# Patient Record
Sex: MALE | Race: WHITE | URBAN - METROPOLITAN AREA
[De-identification: names, ages, dates, MRNs, and addresses within clinical notes are randomized per-mention and may not be internally consistent; named-entity substitution may affect disease eponyms.]

---

## 2017-05-09 ENCOUNTER — OFFICE VISIT (OUTPATIENT)
Dept: FAMILY MEDICINE | Facility: CLINIC | Age: 67
End: 2017-05-09
Payer: COMMERCIAL

## 2017-05-09 VITALS
DIASTOLIC BLOOD PRESSURE: 80 MMHG | OXYGEN SATURATION: 99 % | WEIGHT: 184.5 LBS | RESPIRATION RATE: 18 BRPM | HEIGHT: 74 IN | TEMPERATURE: 98.1 F | BODY MASS INDEX: 23.68 KG/M2 | HEART RATE: 90 BPM | SYSTOLIC BLOOD PRESSURE: 136 MMHG

## 2017-05-09 DIAGNOSIS — J20.9 ACUTE BRONCHITIS, UNSPECIFIED ORGANISM: Primary | ICD-10-CM

## 2017-05-09 PROCEDURE — 99214 OFFICE O/P EST MOD 30 MIN: CPT | Performed by: FAMILY MEDICINE

## 2017-05-09 RX ORDER — CEFDINIR 300 MG/1
300 CAPSULE ORAL 2 TIMES DAILY
Qty: 20 CAPSULE | Refills: 0 | Status: SHIPPED | OUTPATIENT
Start: 2017-05-09 | End: 2018-05-09

## 2017-05-09 RX ORDER — CODEINE PHOSPHATE AND GUAIFENESIN 10; 100 MG/5ML; MG/5ML
1 SOLUTION ORAL EVERY 4 HOURS PRN
Qty: 120 ML | Refills: 0 | Status: SHIPPED | OUTPATIENT
Start: 2017-05-09 | End: 2018-05-09

## 2017-05-09 NOTE — MR AVS SNAPSHOT
"              After Visit Summary   2017    Go Barros    MRN: [id number]           Patient Information     Date Of Birth          1950        Visit Information        Provider Department      2017 2:15 PM Ming Hampton MD [facility]        Today's Diagnoses     Acute bronchitis, unspecified organism    -  1       Follow-ups after your visit        Who to contact     If you have questions or need follow up information about today's clinic visit or your schedule please contact [facility] directly at [phone].  Normal or non-critical lab and imaging results will be communicated to you by [employer], letter or phone within 4 business days after the clinic has received the results. If you do not hear from us within 7 days, please contact the clinic through [employer] or phone. If you have a critical or abnormal lab result, we will notify you by phone as soon as possible.  Submit refill requests through [employer] or call your pharmacy and they will forward the refill request to us. Please allow 3 business days for your refill to be completed.          Additional Information About Your Visit        MyChart Information     [facility] [facility] lets you send messages to your doctor, view your test results, renew your prescriptions, schedule appointments and more. To sign up, go to [email or web address] . Click on \"Log in\" on the left side of the screen, which will take you to the Welcome page. Then click on \"Sign up Now\" on the right side of the page.     You will be asked to enter the access code listed below, as well as some personal information. Please follow the directions to create your username and password.     Your access code is: [email or web address]  Expires: 2017  4:29 PM     Your access code will  in 90 days. If you need help or a new code, please call your [facility] or [phone].        Care EveryWhere ID     This is your Care EveryWhere ID. This could be " "used by other organizations to access your [place] medical records  [id number]        Your Vitals Were     Pulse Temperature Respirations Height Pulse Oximetry BMI (Body Mass Index)    90 98.1  F (36.7  C) (Oral) 18 6' 2\" (1.88 m) 99% 23.69 kg/m2       Blood Pressure from Last 3 Encounters:   05/09/17 136/80    Weight from Last 3 Encounters:   05/09/17 184 lb 8 oz (83.7 kg)              Today, you had the following     No orders found for display         Today's Medication Changes          These changes are accurate as of: 5/9/17 11:59 PM.  If you have any questions, ask your nurse or doctor.               Start taking these medicines.        Dose/Directions    cefdinir 300 MG capsule   Commonly known as:  OMNICEF   Used for:  Acute bronchitis, unspecified organism   Started by:  Ming Hampton MD        Dose:  300 mg   Take 1 capsule (300 mg) by mouth 2 times daily   Quantity:  20 capsule   Refills:  0       guaiFENesin-codeine 100-10 MG/5ML Soln solution   Commonly known as:  ROBITUSSIN AC   Used for:  Acute bronchitis, unspecified organism   Started by:  Ming Hampton MD        Dose:  1 tsp.   Take 5 mLs by mouth every 4 hours as needed for cough   Quantity:  120 mL   Refills:  0            Where to get your medicines      These medications were sent to [place] [address] [place] [address] [address]    Hours:  Tech issues with their phone system Phone:  [phone]     cefdinir 300 MG capsule         Some of these will need a paper prescription and others can be bought over the counter.  Ask your nurse if you have questions.     Bring a paper prescription for each of these medications     guaiFENesin-codeine 100-10 MG/5ML Soln solution                Primary Care Provider    None Specified       No primary provider on file.        Thank you!     Thank you for choosing [facility]  for your care. Our goal is always to provide you with " excellent care. Hearing back from our patients is one way we can continue to improve our services. Please take a few minutes to complete the written survey that you may receive in the mail after your visit with us. Thank you!             Your Updated Medication List - Protect others around you: Learn how to safely use, store and throw away your medicines at www.disposemymeds.org.          This list is accurate as of: 5/9/17 11:59 PM.  Always use your most recent med list.                   Brand Name Dispense Instructions for use    cefdinir 300 MG capsule    OMNICEF    20 capsule    Take 1 capsule (300 mg) by mouth 2 times daily       guaiFENesin-codeine 100-10 MG/5ML Soln solution    ROBITUSSIN AC    120 mL    Take 5 mLs by mouth every 4 hours as needed for cough

## 2017-05-09 NOTE — PROGRESS NOTES
"  SUBJECTIVE:                                                    Go Barros is a 67 year old male who presents to clinic today for the following health issues:    Cough x 10 days. He states he has a chronic cough which typically worsens twice a year. He has never been diagnosed with what is causing it. He saw his PCP in [place] on 5/1 and was given prescriptions for benzonatate and hydrocodone cough syrup. He states these usually work, but have not helped his cough this time. He feels his cough is worsening and he also has reported rhinorrhea and subjective chills. The cough is worse at night. It is mostly non-productive, but in the last 2 days he has been coughing up yellow sputum. No history of asthma or pneumonia. He had a chest x-ray done on 5/1 which was normal.     ROS:  Constitutional, HEENT, cardiovascular, pulmonary, gi and gu systems are negative, except as otherwise noted.    OBJECTIVE:                                                    /80  Pulse 90  Temp 98.1  F (36.7  C) (Oral)  Resp 18  Ht 6' 2\" (1.88 m)  Wt 184 lb 8 oz (83.7 kg)  SpO2 99%  BMI 23.69 kg/m2  Body mass index is 23.69 kg/(m^2).  GENERAL: healthy, alert and no distress  HENT: ear canals and TM's normal, nose and mouth without ulcers or lesions  NECK: no adenopathy, no asymmetry, masses, or scars and thyroid normal to palpation  RESP:  Lung sounds abnormal bilaterally, heard squeaking sound at bases, sound may be consistent with air traveling through fluid   CV: regular rate and rhythm, normal S1 S2, no S3 or S4, no murmur, click or rub    Diagnostic Test Results:  none      ASSESSMENT/PLAN:                                                        ICD-10-CM    1. Acute bronchitis, unspecified organism J20.9 cefdinir (OMNICEF) 300 MG capsule     guaiFENesin-codeine (ROBITUSSIN AC) 100-10 MG/5ML SOLN solution       Plan:  1) given antibiotic for presumed bronchial infection   2) can try alternative cough syrup   3) " x-ray was not obtained as he had one on 5/1 and it likely would not have changed treatment plan  4) recommend follow up with PCP once patient is home next week, can follow up here sooner if needed     25 minutes in exam and counseling. 50% of this time in counseling regards to ongoing cough and bronchial infection.    Discussed. Potential etiologies treatment and follow-up    Ming Hampton MD  [facility]

## 2017-05-09 NOTE — NURSING NOTE
"Chief Complaint   Patient presents with     Cough       Initial /80  Pulse 90  Temp 98.1  F (36.7  C) (Oral)  Resp 18  Ht 6' 2\" (1.88 m)  Wt 184 lb 8 oz (83.7 kg)  SpO2 99%  BMI 23.69 kg/m2 Estimated body mass index is 23.69 kg/(m^2) as calculated from the following:    Height as of this encounter: 6' 2\" (1.88 m).    Weight as of this encounter: 184 lb 8 oz (83.7 kg).  Medication Reconciliation: complete       Makayla Campa CMA      "

## 2017-12-28 ENCOUNTER — OFFICE VISIT (OUTPATIENT)
Dept: FAMILY MEDICINE | Facility: CLINIC | Age: 67
End: 2017-12-28
Payer: COMMERCIAL

## 2017-12-28 VITALS
WEIGHT: 188.3 LBS | HEIGHT: 74 IN | DIASTOLIC BLOOD PRESSURE: 68 MMHG | OXYGEN SATURATION: 100 % | BODY MASS INDEX: 24.17 KG/M2 | TEMPERATURE: 97.8 F | SYSTOLIC BLOOD PRESSURE: 124 MMHG | RESPIRATION RATE: 14 BRPM | HEART RATE: 101 BPM

## 2017-12-28 DIAGNOSIS — R05.9 COUGH: Primary | ICD-10-CM

## 2017-12-28 PROCEDURE — 99213 OFFICE O/P EST LOW 20 MIN: CPT | Performed by: FAMILY MEDICINE

## 2017-12-28 RX ORDER — ATORVASTATIN CALCIUM 40 MG/1
40 TABLET, FILM COATED ORAL
COMMUNITY

## 2017-12-28 RX ORDER — CODEINE PHOSPHATE AND GUAIFENESIN 10; 100 MG/5ML; MG/5ML
1 SOLUTION ORAL EVERY 4 HOURS PRN
Qty: 120 ML | Refills: 1 | Status: SHIPPED | OUTPATIENT
Start: 2017-12-28

## 2017-12-28 RX ORDER — DILTIAZEM HYDROCHLORIDE 240 MG/1
240 CAPSULE, EXTENDED RELEASE ORAL
COMMUNITY

## 2017-12-28 RX ORDER — RIVAROXABAN 20 MG/1
TABLET, FILM COATED ORAL
Refills: 3 | COMMUNITY
Start: 2017-04-19

## 2017-12-28 NOTE — MR AVS SNAPSHOT
"              After Visit Summary   2017    Go Barros    MRN: [id number]           Patient Information     Date Of Birth          1950        Visit Information        Provider Department      2017 3:30 PM Ming Hampton MD [facility]        Today's Diagnoses     Cough    -  1       Follow-ups after your visit        Who to contact     If you have questions or need follow up information about today's clinic visit or your schedule please contact [facility] directly at [phone].  Normal or non-critical lab and imaging results will be communicated to you by MyChart, letter or phone within 4 business days after the clinic has received the results. If you do not hear from us within 7 days, please contact the clinic through [employer] or phone. If you have a critical or abnormal lab result, we will notify you by phone as soon as possible.  Submit refill requests through [employer] or call your pharmacy and they will forward the refill request to us. Please allow 3 business days for your refill to be completed.          Additional Information About Your Visit        MyChart Information     [employer] lets you send messages to your doctor, view your test results, renew your prescriptions, schedule appointments and more. To sign up, go to [email or web address] . Click on \"Log in\" on the left side of the screen, which will take you to the Welcome page. Then click on \"Sign up Now\" on the right side of the page.     You will be asked to enter the access code listed below, as well as some personal information. Please follow the directions to create your username and password.     Your access code is: [email or web address]  Expires: 3/28/2018  3:41 PM     Your access code will  in 90 days. If you need help or a new code, please call your [facility] or [phone].        Care EveryWhere ID     This is your Care EveryWhere ID. This could be used by other organizations to " "access your [place] medical records  [id number]        Your Vitals Were     Pulse Temperature Respirations Height Pulse Oximetry BMI (Body Mass Index)    101 97.8  F (36.6  C) (Oral) 14 6' 2\" (1.88 m) 100% 24.18 kg/m2       Blood Pressure from Last 3 Encounters:   12/28/17 124/68   05/09/17 136/80    Weight from Last 3 Encounters:   12/28/17 188 lb 4.8 oz (85.4 kg)   05/09/17 184 lb 8 oz (83.7 kg)              Today, you had the following     No orders found for display         Today's Medication Changes          These changes are accurate as of: 12/28/17  3:41 PM.  If you have any questions, ask your nurse or doctor.               These medicines have changed or have updated prescriptions.        Dose/Directions    * guaiFENesin-codeine 100-10 MG/5ML Soln solution   Commonly known as:  ROBITUSSIN AC   This may have changed:  Another medication with the same name was added. Make sure you understand how and when to take each.   Used for:  Acute bronchitis, unspecified organism   Changed by:  Ming Hampton MD        Dose:  1 tsp.   Take 5 mLs by mouth every 4 hours as needed for cough   Quantity:  120 mL   Refills:  0       * guaiFENesin-codeine 100-10 MG/5ML Soln solution   Commonly known as:  ROBITUSSIN AC   This may have changed:  You were already taking a medication with the same name, and this prescription was added. Make sure you understand how and when to take each.   Used for:  Cough   Changed by:  Ming Hampton MD        Dose:  1 tsp.   Take 5 mLs by mouth every 4 hours as needed for cough   Quantity:  120 mL   Refills:  1       * Notice:  This list has 2 medication(s) that are the same as other medications prescribed for you. Read the directions carefully, and ask your doctor or other care provider to review them with you.         Where to get your medicines      Some of these will need a paper prescription and others can be bought over the counter.  Ask your nurse if you have questions.     Bring a " paper prescription for each of these medications     guaiFENesin-codeine 100-10 MG/5ML Soln solution                Primary Care Provider Fax #    Physician No Ref-Primary [phone]       No address on file        Equal Access to Services     GEORGE VANN : Yao alma rosa ku marco Soalexisali, waaxda luqadaha, qaybta kaalmada ademajor, margoth davilamaksim martinez. So [facility] [phone].    ATENCIÓN: Si habla español, tiene a gar disposición servicios gratuitos de asistencia lingüística. Llame al [phone].    We comply with applicable federal civil rights laws and [place] laws. We do not discriminate on the basis of race, color, national origin, age, disability, sex, sexual orientation, or gender identity.            Thank you!     Thank you for choosing [facility]  for your care. Our goal is always to provide you with excellent care. Hearing back from our patients is one way we can continue to improve our services. Please take a few minutes to complete the written survey that you may receive in the mail after your visit with us. Thank you!             Your Updated Medication List - Protect others around you: Learn how to safely use, store and throw away your medicines at www.disposemymeds.org.          This list is accurate as of: 12/28/17  3:41 PM.  Always use your most recent med list.                   Brand Name Dispense Instructions for use Diagnosis    atorvastatin 40 MG tablet    LIPITOR     Take 40 mg by mouth        cefdinir 300 MG capsule    OMNICEF    20 capsule    Take 1 capsule (300 mg) by mouth 2 times daily    Acute bronchitis, unspecified organism       diltiazem 240 MG 24 hr ER beaded capsule    TIAZAC     Take 240 mg by mouth        * guaiFENesin-codeine 100-10 MG/5ML Soln solution    ROBITUSSIN AC    120 mL    Take 5 mLs by mouth every 4 hours as needed for cough    Acute bronchitis, unspecified organism       * guaiFENesin-codeine 100-10 MG/5ML Soln solution     ROBITUSSIN AC    120 mL    Take 5 mLs by mouth every 4 hours as needed for cough    Cough       * metFORMIN 500 MG tablet    GLUCOPHAGE     Take 500 mg by mouth        * metFORMIN 1000 MG tablet    GLUCOPHAGE     TAKE 1 TABLET BY MOUTH TWICE A DAY        XARELTO 20 MG Tabs tablet   Generic drug:  rivaroxaban ANTICOAGULANT      TAKE 1 TABLET ORALLY DAILY        * Notice:  This list has 4 medication(s) that are the same as other medications prescribed for you. Read the directions carefully, and ask your doctor or other care provider to review them with you.

## 2017-12-28 NOTE — PROGRESS NOTES
SUBJECTIVE:   Go Barros is a 67 year old male who presents to clinic today for the following health issues:      Chronic cough for years, getting worse over the last 3 months, cough has become more consistent. Sees pulmonary and has CT of lungs schedules.  Has been using inhaler spray for 5 weeks. Cant sleep at night. States the only think that work is hydrocodone.      OBJECTIVE:  Vitals as noted by Nurse/MA above.  Appearance: in no apparent distress.   ENT- nasal mucosa pale and congested.   Chest - chest clear to IPPA, S1, S2 normal, no murmur, no gallop, rate regular .    ASSESSMENT:   Bronchitis; this is chronic and he uses appears chronically sara ac. Down to 1 tsp per night. Workup with pulmonology.     PLAN:  Symptomatic therapy suggested: push fluids.     He knows that this is not treatment. Needs his PCP to quide him as well as pulmonology. If no treatable findings from pulmonology then needs plan long term how to treat his symptom.

## 2017-12-28 NOTE — NURSING NOTE
"Chief Complaint   Patient presents with     Cough       Initial /68  Pulse 101  Temp 97.8  F (36.6  C) (Oral)  Resp 14  Ht 6' 2\" (1.88 m)  Wt 188 lb 4.8 oz (85.4 kg)  SpO2 100%  BMI 24.18 kg/m2 Estimated body mass index is 24.18 kg/(m^2) as calculated from the following:    Height as of this encounter: 6' 2\" (1.88 m).    Weight as of this encounter: 188 lb 4.8 oz (85.4 kg).  Medication Reconciliation: complete       Makayla Campa CMA      "

## 2018-05-09 ENCOUNTER — OFFICE VISIT (OUTPATIENT)
Dept: FAMILY MEDICINE | Facility: CLINIC | Age: 68
End: 2018-05-09
Payer: COMMERCIAL

## 2018-05-09 VITALS
DIASTOLIC BLOOD PRESSURE: 86 MMHG | OXYGEN SATURATION: 100 % | TEMPERATURE: 98.3 F | HEART RATE: 87 BPM | RESPIRATION RATE: 16 BRPM | WEIGHT: 184.7 LBS | BODY MASS INDEX: 23.7 KG/M2 | HEIGHT: 74 IN | SYSTOLIC BLOOD PRESSURE: 120 MMHG

## 2018-05-09 DIAGNOSIS — R05.3 CHRONIC COUGH: Primary | ICD-10-CM

## 2018-05-09 PROCEDURE — 99213 OFFICE O/P EST LOW 20 MIN: CPT | Performed by: FAMILY MEDICINE

## 2018-05-09 RX ORDER — SILDENAFIL 100 MG/1
100 TABLET, FILM COATED ORAL
COMMUNITY
Start: 2018-04-09

## 2018-05-09 RX ORDER — OMEPRAZOLE 40 MG/1
40 CAPSULE, DELAYED RELEASE ORAL DAILY
Qty: 30 CAPSULE | Refills: 1 | Status: SHIPPED | OUTPATIENT
Start: 2018-05-09

## 2018-05-09 RX ORDER — IMIPRAMINE HCL 25 MG
25 TABLET ORAL
COMMUNITY

## 2018-05-09 RX ORDER — FLUTICASONE PROPIONATE 50 MCG
1-2 SPRAY, SUSPENSION (ML) NASAL DAILY
COMMUNITY
Start: 2018-05-09

## 2018-05-09 RX ORDER — MONTELUKAST SODIUM 10 MG/1
10 TABLET ORAL AT BEDTIME
Qty: 30 TABLET | Refills: 1 | Status: SHIPPED | OUTPATIENT
Start: 2018-05-09

## 2018-05-09 NOTE — NURSING NOTE
"Chief Complaint   Patient presents with     Chronic Cough       Initial /86 (BP Location: Right arm, Patient Position: Chair, Cuff Size: Adult Regular)  Pulse 87  Temp 98.3  F (36.8  C) (Oral)  Resp 16  Ht 6' 2\" (1.88 m)  Wt 184 lb 11.2 oz (83.8 kg)  SpO2 100%  BMI 23.71 kg/m2 Estimated body mass index is 23.71 kg/(m^2) as calculated from the following:    Height as of this encounter: 6' 2\" (1.88 m).    Weight as of this encounter: 184 lb 11.2 oz (83.8 kg).  Medication Reconciliation: complete      Health Maintenance addressed:  Colonoscopy/FIT done in [place] a few yrs ago and tetanus     Possibly completing today per provider review.    COLEMAN Mackey        "

## 2018-05-09 NOTE — PROGRESS NOTES
"  SUBJECTIVE:   Go Barros is a 68 year old male who presents to clinic today for the following health issues:    Patient reports a chronic cough for the past thirty years. The cough worsens at night and occurs if he talks for more than five minutes. The cough keeps him awake during the night. Patient reports yellow/green nasal drainage that began 5/1/2018. He takes Zyrtec for allergies. Within the past year, he had full work-ups with ENT and pulmonology. His chest XR and spirometry were normal-per patient. Patient has used various inhalers and nasal sprays without relief. Denies fever, heartburn.     Problem list and histories reviewed & adjusted, as indicated.  Additional history: as documented    There is no problem list on file for this patient.    History reviewed. No pertinent surgical history.    Social History   Substance Use Topics     Smoking status: Never Smoker     Smokeless tobacco: Never Used     Alcohol use No     History reviewed. No pertinent family history.        Reviewed and updated as needed this visit by clinical staff  Tobacco  Allergies  Meds  Problems  Med Hx  Surg Hx  Fam Hx  Soc Hx        Reviewed and updated as needed this visit by Provider  Problems         ROS:  CONSTITUTIONAL:NEGATIVE for fever   ENT/MOUTH: as noted above   RESP: as noted above   GI: NEGATIVE for heartburn or reflux    This document serves as a record of the services and decisions personally performed and made by Bjorn Guerra MD. It was created on his behalf by Matilde Hu, a trained medical scribe. The creation of this document is based on the provider's statements to the medical scribe.  Matilde Hu 7:38 AM May 9, 2018    OBJECTIVE:     /86 (BP Location: Right arm, Patient Position: Chair, Cuff Size: Adult Regular)  Pulse 87  Temp 98.3  F (36.8  C) (Oral)  Resp 16  Ht 1.88 m (6' 2\")  Wt 83.8 kg (184 lb 11.2 oz)  SpO2 100%  BMI 23.71 kg/m2  Body mass index is 23.71 " kg/(m^2).  GENERAL: healthy, alert and no distress  EYES: Eyes grossly normal to inspection, PERRL and conjunctivae and sclerae normal  HENT: ear canals and TM's normal, nose and mouth without ulcers or lesions  RESP: lungs clear to auscultation - no rales, rhonchi or wheezes  CV: regular rate and rhythm, normal S1 S2, no S3 or S4, no murmur, click or rub, no peripheral edema and peripheral pulses strong    ASSESSMENT/PLAN:     1. Chronic cough  Discussed common causes of chronic cough including postnasal drip, allergies, asthma, laryngopharyngeal reflux, as well as other potential causes.  Discussed trial of medications below - he has otherwise had appropriate workup for his cough from outside clinics.  - montelukast (SINGULAIR) 10 MG tablet; Take 1 tablet (10 mg) by mouth At Bedtime  Dispense: 30 tablet; Refill: 1  - omeprazole (PRILOSEC) 40 MG capsule; Take 1 capsule (40 mg) by mouth daily Take 30-60 minutes before a meal.  Dispense: 30 capsule; Refill: 1  - fluticasone (FLONASE) 50 MCG/ACT spray; Spray 1-2 sprays into both nostrils daily    The information in this document, created by the medical scribe for me, accurately reflects the services I personally performed and the decisions made by me. I have reviewed and approved this document for accuracy prior to leaving the patient care area.  May 9, 2018 7:50 AM    jBorn Guerra MD  [facility]

## 2018-05-09 NOTE — MR AVS SNAPSHOT
"              After Visit Summary   2018    Go Barros    MRN: [id number]           Patient Information     Date Of Birth          1950        Visit Information        Provider Department      2018 7:20 AM Bjorn Guerra MD [facility]        Today's Diagnoses     Chronic cough    -  1       Follow-ups after your visit        Who to contact     If you have questions or need follow up information about today's clinic visit or your schedule please contact [facility] directly at [phone].  Normal or non-critical lab and imaging results will be communicated to you by MyChart, letter or phone within 4 business days after the clinic has received the results. If you do not hear from us within 7 days, please contact the clinic through [employer] or phone. If you have a critical or abnormal lab result, we will notify you by phone as soon as possible.  Submit refill requests through [employer]oly or call your pharmacy and they will forward the refill request to us. Please allow 3 business days for your refill to be completed.          Additional Information About Your Visit        MyChart Information     frooly lets you send messages to your doctor, view your test results, renew your prescriptions, schedule appointments and more. To sign up, go to [email or web address] . Click on \"Log in\" on the left side of the screen, which will take you to the Welcome page. Then click on \"Sign up Now\" on the right side of the page.     You will be asked to enter the access code listed below, as well as some personal information. Please follow the directions to create your username and password.     Your access code is: [email or web address]  Expires: 2018  7:49 AM     Your access code will  in 90 days. If you need help or a new code, please call your [facility] or [phone].        Care EveryWhere ID     This is your Care EveryWhere ID. This could be used by other " "organizations to access your [place] medical records  [id number]        Your Vitals Were     Pulse Temperature Respirations Height Pulse Oximetry BMI (Body Mass Index)    87 98.3  F (36.8  C) (Oral) 16 6' 2\" (1.88 m) 100% 23.71 kg/m2       Blood Pressure from Last 3 Encounters:   05/09/18 120/86   12/28/17 124/68   05/09/17 136/80    Weight from Last 3 Encounters:   05/09/18 184 lb 11.2 oz (83.8 kg)   12/28/17 188 lb 4.8 oz (85.4 kg)   05/09/17 184 lb 8 oz (83.7 kg)              Today, you had the following     No orders found for display         Today's Medication Changes          These changes are accurate as of 5/9/18  7:49 AM.  If you have any questions, ask your nurse or doctor.               Start taking these medicines.        Dose/Directions    fluticasone 50 MCG/ACT spray   Commonly known as:  FLONASE   Used for:  Chronic cough   Started by:  Bjorn Guerra MD        Dose:  1-2 spray   Spray 1-2 sprays into both nostrils daily   Refills:  0       montelukast 10 MG tablet   Commonly known as:  SINGULAIR   Used for:  Chronic cough   Started by:  Bjorn Guerra MD        Dose:  10 mg   Take 1 tablet (10 mg) by mouth At Bedtime   Quantity:  30 tablet   Refills:  1       omeprazole 40 MG capsule   Commonly known as:  priLOSEC   Used for:  Chronic cough   Started by:  Bjorn Guerra MD        Dose:  40 mg   Take 1 capsule (40 mg) by mouth daily Take 30-60 minutes before a meal.   Quantity:  30 capsule   Refills:  1            Where to get your medicines      These medications were sent to [place] [address] [place] [facility] [address] [facility]  [address] [facility] [address]    Hours:  Tech issues with their phone system Phone:  [phone]     montelukast 10 MG tablet    omeprazole 40 MG capsule         Some of these will need a paper prescription and others can be bought over the counter.  Ask your nurse if you have questions.     You don't need a prescription for these medications "     fluticasone 50 MCG/ACT spray                Primary Care Provider Fax #    Physician No Ref-Primary [phone]       No address on file        Equal Access to Services     GEORGE VANN : Hadii aad ku hadyadi Montanez, seda carlosnettie, ines olpes, margoth martinez. So [facility] [phone].    ATENCIÓN: Si habla español, tiene a gar disposición servicios gratuitos de asistencia lingüística. Llame al [phone].    We comply with applicable federal civil rights laws and [place] laws. We do not discriminate on the basis of race, color, national origin, age, disability, sex, sexual orientation, or gender identity.            Thank you!     Thank you for choosing [facility]  for your care. Our goal is always to provide you with excellent care. Hearing back from our patients is one way we can continue to improve our services. Please take a few minutes to complete the written survey that you may receive in the mail after your visit with us. Thank you!             Your Updated Medication List - Protect others around you: Learn how to safely use, store and throw away your medicines at www.disposemymeds.org.          This list is accurate as of 5/9/18  7:49 AM.  Always use your most recent med list.                   Brand Name Dispense Instructions for use Diagnosis    atorvastatin 40 MG tablet    LIPITOR     Take 40 mg by mouth        diltiazem 240 MG 24 hr ER beaded capsule    TIAZAC     Take 240 mg by mouth        fluticasone 50 MCG/ACT spray    FLONASE     Spray 1-2 sprays into both nostrils daily    Chronic cough       guaiFENesin-codeine 100-10 MG/5ML Soln solution    ROBITUSSIN AC    120 mL    Take 5 mLs by mouth every 4 hours as needed for cough    Cough       imipramine 25 MG tablet    TOFRANIL     Take 25 mg by mouth    Chronic cough       metFORMIN 500 MG tablet    GLUCOPHAGE     Take 500 mg by mouth        montelukast 10 MG tablet    SINGULAIR    30  tablet    Take 1 tablet (10 mg) by mouth At Bedtime    Chronic cough       omeprazole 40 MG capsule    priLOSEC    30 capsule    Take 1 capsule (40 mg) by mouth daily Take 30-60 minutes before a meal.    Chronic cough       sildenafil 100 MG tablet    VIAGRA     Take 100 mg by mouth    Chronic cough       XARELTO 20 MG Tabs tablet   Generic drug:  rivaroxaban ANTICOAGULANT      TAKE 1 TABLET ORALLY DAILY

## 2018-05-09 NOTE — Clinical Note
Please abstract the following data from this visit with this patient into the appropriate field in Epic:  Colonoscopy done on this date: 2/24/16 (approximately), by this group: [facility], results were normal.

## 2018-11-29 DIAGNOSIS — R05.3 CHRONIC COUGH: ICD-10-CM

## 2018-11-29 NOTE — TELEPHONE ENCOUNTER
"Requested Prescriptions   Pending Prescriptions Disp Refills     montelukast (SINGULAIR) 10 MG tablet [Pharmacy Med Name: MONTELUKAST SOD 10 MG TABLET] 30 tablet 0    Last Written Prescription Date:  05/09/2018  Last Fill Quantity: 30 tablet,  # refills: 1   Last office visit: 5/9/2018 with prescribing provider:  05/09/2018   Future Office Visit:     Sig: TAKE 1 TABLET (10 MG) BY MOUTH AT BEDTIME    Leukotriene Inhibitors Protocol Passed    11/29/2018  9:01 AM       Passed - Patient is age 12 or older    If patient is under 16, ok to refill using age based dosing.          Passed - Recent (12 mo) or future (30 days) visit within the authorizing provider's specialty    Patient had office visit in the last 12 months or has a visit in the next 30 days with authorizing provider or within the authorizing provider's specialty.  See \"Patient Info\" tab in inbasket, or \"Choose Columns\" in Meds & Orders section of the refill encounter.                Luis M Chen XRT  "

## 2018-11-29 NOTE — TELEPHONE ENCOUNTER
[facility]  This was a trial medication for a chronic cough (only prescribed for 2 month back in May)    Needs to know if still taking and if working    Purvi Childers RN, BSN

## 2018-11-30 RX ORDER — MONTELUKAST SODIUM 10 MG/1
TABLET ORAL
Qty: 30 TABLET | Refills: 0 | OUTPATIENT
Start: 2018-11-30

## 2018-11-30 NOTE — TELEPHONE ENCOUNTER
Pt does not live in [place] per chart and [place]. Pt needs to follow up with primary    Purvi Childers RN, BSN

## 2018-12-13 DIAGNOSIS — R05.3 CHRONIC COUGH: ICD-10-CM

## 2018-12-13 RX ORDER — MONTELUKAST SODIUM 10 MG/1
10 TABLET ORAL AT BEDTIME
Qty: 30 TABLET | Refills: 0 | OUTPATIENT
Start: 2018-12-13

## 2018-12-13 NOTE — TELEPHONE ENCOUNTER
"Requested Prescriptions   Pending Prescriptions Disp Refills     montelukast (SINGULAIR) 10 MG tablet [Pharmacy Med Name: MONTELUKAST SOD 10 MG TABLET] 30 tablet 0    Last Written Prescription Date:  05/09/2018  Last Fill Quantity: 30 tablet,  # refills: 1   Last office visit: 5/9/2018 with prescribing provider:  05/09/2018   Future Office Visit:     Sig: TAKE 1 TABLET (10 MG) BY MOUTH AT BEDTIME    Leukotriene Inhibitors Protocol Passed - 12/13/2018 11:46 AM       Passed - Patient is age 12 or older    If patient is under 16, ok to refill using age based dosing.          Passed - Recent (12 mo) or future (30 days) visit within the authorizing provider's specialty    Patient had office visit in the last 12 months or has a visit in the next 30 days with authorizing provider or within the authorizing provider's specialty.  See \"Patient Info\" tab in inbasket, or \"Choose Columns\" in Meds & Orders section of the refill encounter.              Luis M Chen XRT  "

## 2018-12-20 DIAGNOSIS — R05.3 CHRONIC COUGH: ICD-10-CM

## 2018-12-20 NOTE — TELEPHONE ENCOUNTER
"Requested Prescriptions   Pending Prescriptions Disp Refills     montelukast (SINGULAIR) 10 MG tablet [Pharmacy Med Name: MONTELUKAST SOD 10 MG TABLET] 30 tablet 0    Last Written Prescription Date:  05/09/2018  Last Fill Quantity: 30 tablet,  # refills: 1   Last office visit: 5/9/2018 with prescribing provider:  05/09/2018   Future Office Visit:     Sig: TAKE 1 TABLET (10 MG) BY MOUTH AT BEDTIME    Leukotriene Inhibitors Protocol Passed - 12/20/2018  5:27 PM       Passed - Patient is age 12 or older    If patient is under 16, ok to refill using age based dosing.          Passed - Recent (12 mo) or future (30 days) visit within the authorizing provider's specialty    Patient had office visit in the last 12 months or has a visit in the next 30 days with authorizing provider or within the authorizing provider's specialty.  See \"Patient Info\" tab in inbasket, or \"Choose Columns\" in Meds & Orders section of the refill encounter.              Luis M Chen XRT  "

## 2018-12-21 RX ORDER — MONTELUKAST SODIUM 10 MG/1
10 TABLET ORAL AT BEDTIME
Qty: 30 TABLET | Refills: 0 | OUTPATIENT
Start: 2018-12-21

## 2018-12-21 NOTE — TELEPHONE ENCOUNTER
This is the 3rd request and pt was seen only for acute issue while in [place].  Pt states he does not need a refill     Called pharmacy to inform them and they will deny request  Purvi Childers RN, BSN

## 2021-04-08 DIAGNOSIS — Z23 ENCOUNTER FOR IMMUNIZATION: ICD-10-CM
